# Patient Record
Sex: MALE | Race: WHITE | NOT HISPANIC OR LATINO | Employment: FULL TIME | URBAN - METROPOLITAN AREA
[De-identification: names, ages, dates, MRNs, and addresses within clinical notes are randomized per-mention and may not be internally consistent; named-entity substitution may affect disease eponyms.]

---

## 2020-03-27 ENCOUNTER — OFFICE VISIT (OUTPATIENT)
Dept: CARDIOLOGY | Facility: MEDICAL CENTER | Age: 65
End: 2020-03-27
Payer: COMMERCIAL

## 2020-03-27 ENCOUNTER — TELEPHONE (OUTPATIENT)
Dept: CARDIOLOGY | Facility: MEDICAL CENTER | Age: 65
End: 2020-03-27

## 2020-03-27 VITALS
HEART RATE: 52 BPM | WEIGHT: 201 LBS | BODY MASS INDEX: 29.77 KG/M2 | SYSTOLIC BLOOD PRESSURE: 120 MMHG | DIASTOLIC BLOOD PRESSURE: 70 MMHG | HEIGHT: 69 IN | OXYGEN SATURATION: 96 %

## 2020-03-27 DIAGNOSIS — R60.0 EDEMA, LOWER EXTREMITY: ICD-10-CM

## 2020-03-27 DIAGNOSIS — S80.11XA HEMATOMA OF RIGHT LOWER EXTREMITY, INITIAL ENCOUNTER: ICD-10-CM

## 2020-03-27 LAB — EKG IMPRESSION: NORMAL

## 2020-03-27 PROCEDURE — 99203 OFFICE O/P NEW LOW 30 MIN: CPT | Performed by: INTERNAL MEDICINE

## 2020-03-27 PROCEDURE — 93000 ELECTROCARDIOGRAM COMPLETE: CPT | Performed by: INTERNAL MEDICINE

## 2020-03-27 RX ORDER — MINOCYCLINE HYDROCHLORIDE 100 MG/1
CAPSULE ORAL
COMMUNITY
Start: 2020-01-08 | End: 2022-01-12

## 2020-03-27 ASSESSMENT — ENCOUNTER SYMPTOMS
ABDOMINAL PAIN: 0
SHORTNESS OF BREATH: 0
COUGH: 0
BLURRED VISION: 0
DIZZINESS: 0
FOCAL WEAKNESS: 0
NAUSEA: 0
BRUISES/BLEEDS EASILY: 0
WEAKNESS: 0
CHILLS: 0
PND: 0
PALPITATIONS: 0
FEVER: 0
FALLS: 0
SORE THROAT: 0
CLAUDICATION: 0

## 2020-03-27 NOTE — PROGRESS NOTES
Chief Complaint   Patient presents with   • Edema       Subjective:   Hitesh Rodriguez is a 64 y.o. male who presents today for evaluation of a hematoma of the right lower leg.  He had been helping work pushing on heavy things and noticed a big hematoma in the mid right lower inner leg over the next couple of days this had traveled down his inner aspect of the leg now with significant bruising he did have swelling that slowly got better he does have pressure on that side of the leg.  Had not noticed significant varicose veins or leg swelling in the past is wearing active typically over the last year he is gained some weight because his job is more sedentary long-term he is having good health and regular follow-up    History reviewed. No pertinent past medical history.  Past Surgical History:   Procedure Laterality Date   • APPENDECTOMY  05/2018   • COLONOSCOPY  2009    neg     Family History   Problem Relation Age of Onset   • No Known Problems Mother    • No Known Problems Father    • Heart Disease Neg Hx      Social History     Socioeconomic History   • Marital status:      Spouse name: Not on file   • Number of children: Not on file   • Years of education: Not on file   • Highest education level: Not on file   Occupational History   • Not on file   Social Needs   • Financial resource strain: Not on file   • Food insecurity     Worry: Not on file     Inability: Not on file   • Transportation needs     Medical: Not on file     Non-medical: Not on file   Tobacco Use   • Smoking status: Never Smoker   • Smokeless tobacco: Never Used   Substance and Sexual Activity   • Alcohol use: Yes     Frequency: Monthly or less     Drinks per session: 1 or 2     Binge frequency: Never     Comment: occ   • Drug use: No   • Sexual activity: Not on file   Lifestyle   • Physical activity     Days per week: Not on file     Minutes per session: Not on file   • Stress: Not on file   Relationships   • Social connections     Talks  "on phone: Not on file     Gets together: Not on file     Attends [employer] service: Not on file     Active member of club or organization: Not on file     Attends meetings of clubs or organizations: Not on file     Relationship status: Not on file   • Intimate partner violence     Fear of current or ex partner: Not on file     Emotionally abused: Not on file     Physically abused: Not on file     Forced sexual activity: Not on file   Other Topics Concern   • Not on file   Social History Narrative   • Not on file     Allergies   Allergen Reactions   • Minocycline Hives     Outpatient Encounter Medications as of 3/27/2020   Medication Sig Dispense Refill   • metronidazole (METROCREAM) 0.75 % cream      • minocycline (MINOCIN) 100 MG Cap        No facility-administered encounter medications on file as of 3/27/2020.      Review of Systems   Constitutional: Negative for chills and fever.   HENT: Negative for sore throat.    Eyes: Negative for blurred vision.   Respiratory: Negative for cough and shortness of breath.    Cardiovascular: Positive for leg swelling. Negative for chest pain, palpitations, claudication and PND.   Gastrointestinal: Negative for abdominal pain and nausea.   Musculoskeletal: Negative for falls and joint pain.   Skin: Negative for rash.   Neurological: Negative for dizziness, focal weakness and weakness.   Endo/Heme/Allergies: Does not bruise/bleed easily.        Objective:   /70 (BP Location: Right arm, Patient Position: Sitting)   Pulse (!) 52   Ht 1.753 m (5' 9\")   Wt 91.2 kg (201 lb)   SpO2 96%   BMI 29.68 kg/m²     Physical Exam   Constitutional: No distress.   Eyes: No scleral icterus.   Musculoskeletal:         General: Edema present.      Comments: The right leg has likely a ruptured varicose vein with some ecchymosis and bruising in the inner aspect of the lower leg down to the ankle normal range of motion of the ankle     We reviewed in person the most recent labs  Recent " Results (from the past 4032 hour(s))   EKG    Collection Time: 20 10:18 AM   Result Value Ref Range    Report       [facility]    Test Date:  2020  Pt Name:    MIRANDA VANN            Department: [facility]  MRN:        [id number]                      Room:  Gender:     Male                         Technician: MOHIT  :        1955                   Requested By:YENNY GAMINO  Order #:    [id number]                    Reading MD:    Measurements  Intervals                                Axis  Rate:       43                           P:          42  AK:         204                          QRS:        5  QRSD:       90                           T:          31  QT:         428  QTc:        362    Interpretive Statements  SINUS BRADYCARDIA  No previous ECG available for comparison           Assessment:     1. Edema, lower extremity  EKG    US-EXTREMITY VENOUS LOWER UNILAT RIGHT   2. Hematoma of right lower extremity, initial encounter         Medical Decision Making:  Today's Assessment / Status / Plan:     It was my pleasure to meet with Mr. Vann.    Likely had a ruptured varicose vein with significant bruising this appears to be healing normally so will rule out underlying trauma and DVT with a stat lower extremity Doppler if this were negative would just do conservative therapies    Blood pressure is well controlled.      His cholesterol is normal    We will follow up with Mr. Vann on the results of the testing over the phone. We will determine further follow-up from there.    It is my pleasure to participate in the care of Mr. Vann.  Please do not hesitate to contact me with questions or concerns.    Yenny Gamino MD PhD FAC  Cardiologist [facility]    Please note that this dictation was created using voice recognition software. There may be errors I did not discover before finalizing the note.

## 2020-03-27 NOTE — TELEPHONE ENCOUNTER
Result Notes for US-EXTREMITY VENOUS LOWER UNILAT RIGHT   Notes recorded by Wan Bennett M.D. on 3/27/2020 at 1:50 PM PDT    The ultrasound duplex looks good, please let him know     Thank you     Letter printed with MD recommendations and mailed to pt mailing address.

## 2022-01-12 PROBLEM — K42.9 UMBILICAL HERNIA WITHOUT OBSTRUCTION AND WITHOUT GANGRENE: Status: ACTIVE | Noted: 2022-01-12

## 2022-01-12 PROBLEM — Z00.00 HEALTHCARE MAINTENANCE: Status: ACTIVE | Noted: 2022-01-12
